# Patient Record
Sex: MALE | Race: BLACK OR AFRICAN AMERICAN | NOT HISPANIC OR LATINO | Employment: FULL TIME | URBAN - METROPOLITAN AREA
[De-identification: names, ages, dates, MRNs, and addresses within clinical notes are randomized per-mention and may not be internally consistent; named-entity substitution may affect disease eponyms.]

---

## 2017-05-19 ENCOUNTER — ANESTHESIA (OUTPATIENT)
Dept: SURGERY | Facility: OTHER | Age: 52
End: 2017-05-19
Payer: COMMERCIAL

## 2017-05-19 ENCOUNTER — ANESTHESIA EVENT (OUTPATIENT)
Dept: SURGERY | Facility: OTHER | Age: 52
End: 2017-05-19
Payer: COMMERCIAL

## 2017-05-19 ENCOUNTER — HOSPITAL ENCOUNTER (OUTPATIENT)
Facility: OTHER | Age: 52
Discharge: HOME | End: 2017-05-19
Attending: ORTHOPAEDIC SURGERY | Admitting: ORTHOPAEDIC SURGERY
Payer: COMMERCIAL

## 2017-05-19 VITALS
HEART RATE: 53 BPM | WEIGHT: 300 LBS | SYSTOLIC BLOOD PRESSURE: 123 MMHG | OXYGEN SATURATION: 98 % | TEMPERATURE: 98 F | DIASTOLIC BLOOD PRESSURE: 80 MMHG | RESPIRATION RATE: 16 BRPM | BODY MASS INDEX: 36.53 KG/M2 | HEIGHT: 76 IN

## 2017-05-19 DIAGNOSIS — R20.0 NUMBNESS OF LEFT HAND: Primary | ICD-10-CM

## 2017-05-19 PROCEDURE — 25000003 PHARM REV CODE 250: Performed by: NURSE ANESTHETIST, CERTIFIED REGISTERED

## 2017-05-19 PROCEDURE — 36000707: Performed by: ORTHOPAEDIC SURGERY

## 2017-05-19 PROCEDURE — 71000015 HC POSTOP RECOV 1ST HR: Performed by: ORTHOPAEDIC SURGERY

## 2017-05-19 PROCEDURE — 63600175 PHARM REV CODE 636 W HCPCS: Performed by: NURSE ANESTHETIST, CERTIFIED REGISTERED

## 2017-05-19 PROCEDURE — 63600175 PHARM REV CODE 636 W HCPCS: Performed by: ORTHOPAEDIC SURGERY

## 2017-05-19 PROCEDURE — 37000009 HC ANESTHESIA EA ADD 15 MINS: Performed by: ORTHOPAEDIC SURGERY

## 2017-05-19 PROCEDURE — 36000706: Performed by: ORTHOPAEDIC SURGERY

## 2017-05-19 PROCEDURE — 25000003 PHARM REV CODE 250: Performed by: ORTHOPAEDIC SURGERY

## 2017-05-19 PROCEDURE — 37000008 HC ANESTHESIA 1ST 15 MINUTES: Performed by: ORTHOPAEDIC SURGERY

## 2017-05-19 RX ORDER — SODIUM CHLORIDE, SODIUM LACTATE, POTASSIUM CHLORIDE, CALCIUM CHLORIDE 600; 310; 30; 20 MG/100ML; MG/100ML; MG/100ML; MG/100ML
INJECTION, SOLUTION INTRAVENOUS CONTINUOUS PRN
Status: DISCONTINUED | OUTPATIENT
Start: 2017-05-19 | End: 2017-05-19

## 2017-05-19 RX ORDER — GABAPENTIN 300 MG/1
300 CAPSULE ORAL 2 TIMES DAILY
COMMUNITY

## 2017-05-19 RX ORDER — SILDENAFIL 100 MG/1
100 TABLET, FILM COATED ORAL
COMMUNITY

## 2017-05-19 RX ORDER — ACETAMINOPHEN 10 MG/ML
INJECTION, SOLUTION INTRAVENOUS
Status: DISCONTINUED | OUTPATIENT
Start: 2017-05-19 | End: 2017-05-19

## 2017-05-19 RX ORDER — BUPIVACAINE HYDROCHLORIDE 2.5 MG/ML
INJECTION, SOLUTION EPIDURAL; INFILTRATION; INTRACAUDAL
Status: DISCONTINUED | OUTPATIENT
Start: 2017-05-19 | End: 2017-05-19 | Stop reason: HOSPADM

## 2017-05-19 RX ORDER — MIDAZOLAM HYDROCHLORIDE 1 MG/ML
INJECTION INTRAMUSCULAR; INTRAVENOUS
Status: DISCONTINUED | OUTPATIENT
Start: 2017-05-19 | End: 2017-05-19

## 2017-05-19 RX ORDER — PROPOFOL 10 MG/ML
VIAL (ML) INTRAVENOUS
Status: DISCONTINUED | OUTPATIENT
Start: 2017-05-19 | End: 2017-05-19

## 2017-05-19 RX ORDER — PROPOFOL 10 MG/ML
VIAL (ML) INTRAVENOUS CONTINUOUS PRN
Status: DISCONTINUED | OUTPATIENT
Start: 2017-05-19 | End: 2017-05-19

## 2017-05-19 RX ORDER — OXYCODONE AND ACETAMINOPHEN 5; 325 MG/1; MG/1
1 TABLET ORAL EVERY 6 HOURS PRN
Qty: 30 TABLET | Refills: 0 | Status: SHIPPED | OUTPATIENT
Start: 2017-05-19

## 2017-05-19 RX ORDER — TRAMADOL HYDROCHLORIDE 50 MG/1
50 TABLET ORAL EVERY 6 HOURS PRN
COMMUNITY

## 2017-05-19 RX ORDER — LIDOCAINE HCL/PF 100 MG/5ML
SYRINGE (ML) INTRAVENOUS
Status: DISCONTINUED | OUTPATIENT
Start: 2017-05-19 | End: 2017-05-19

## 2017-05-19 RX ORDER — FENTANYL CITRATE 50 UG/ML
INJECTION, SOLUTION INTRAMUSCULAR; INTRAVENOUS
Status: DISCONTINUED | OUTPATIENT
Start: 2017-05-19 | End: 2017-05-19

## 2017-05-19 RX ORDER — LIDOCAINE 50 MG/G
1 PATCH TOPICAL DAILY PRN
COMMUNITY

## 2017-05-19 RX ORDER — CEFAZOLIN SODIUM 2 G/50ML
2 SOLUTION INTRAVENOUS
Status: COMPLETED | OUTPATIENT
Start: 2017-05-19 | End: 2017-05-19

## 2017-05-19 RX ORDER — CEPHALEXIN 500 MG/1
500 CAPSULE ORAL 2 TIMES DAILY
Qty: 14 CAPSULE | Refills: 0 | Status: SHIPPED | OUTPATIENT
Start: 2017-05-19 | End: 2017-05-26

## 2017-05-19 RX ADMIN — PROPOFOL 40 MG: 10 INJECTION, EMULSION INTRAVENOUS at 10:05

## 2017-05-19 RX ADMIN — FENTANYL CITRATE 100 MCG: 50 INJECTION, SOLUTION INTRAMUSCULAR; INTRAVENOUS at 09:05

## 2017-05-19 RX ADMIN — PROPOFOL 20 MG: 10 INJECTION, EMULSION INTRAVENOUS at 10:05

## 2017-05-19 RX ADMIN — SODIUM CHLORIDE, SODIUM LACTATE, POTASSIUM CHLORIDE, AND CALCIUM CHLORIDE: 600; 310; 30; 20 INJECTION, SOLUTION INTRAVENOUS at 09:05

## 2017-05-19 RX ADMIN — MIDAZOLAM HYDROCHLORIDE 2 MG: 1 INJECTION, SOLUTION INTRAMUSCULAR; INTRAVENOUS at 09:05

## 2017-05-19 RX ADMIN — PROPOFOL 75 MCG/KG/MIN: 10 INJECTION, EMULSION INTRAVENOUS at 10:05

## 2017-05-19 RX ADMIN — CEFAZOLIN SODIUM 3 G: 2 SOLUTION INTRAVENOUS at 10:05

## 2017-05-19 RX ADMIN — LIDOCAINE HYDROCHLORIDE 50 MG: 20 INJECTION, SOLUTION INTRAVENOUS at 10:05

## 2017-05-19 RX ADMIN — ACETAMINOPHEN 1000 MG: 10 INJECTION, SOLUTION INTRAVENOUS at 10:05

## 2017-05-19 NOTE — ANESTHESIA POSTPROCEDURE EVALUATION
"Anesthesia Post Evaluation    Patient: Jong Alfaro    Procedure(s) Performed: Procedure(s) (LRB):  RELEASE-CARPAL TUNNEL (Left)    Final Anesthesia Type: general  Patient location during evaluation: [facility]  Patient participation: Yes- Able to Participate  Level of consciousness: awake and alert and oriented  Post-procedure vital signs: reviewed and stable  Pain management: adequate  Airway patency: patent  PONV status at discharge: No PONV  Anesthetic complications: no      Cardiovascular status: hemodynamically stable  Respiratory status: unassisted, spontaneous ventilation and room air  Hydration status: euvolemic  Follow-up not needed.        Visit Vitals    BP (!) 146/81 (BP Location: Right arm, Patient Position: Lying, BP Method: Automatic)    Pulse (!) 59    Temp 36.8 °C (98.2 °F) (Oral)    Resp 16    Ht 6' 4" (1.93 m)    Wt 136.1 kg (300 lb)    SpO2 96%    BMI 36.52 kg/m2       Pain/Emma Score: Pain Assessment Performed: Yes (5/19/2017  8:20 AM)  Presence of Pain: complains of pain/discomfort (5/19/2017  8:20 AM)      "

## 2017-05-19 NOTE — PLAN OF CARE
Jong Alfaro has met all discharge criteria from Phase II. Vital Signs are stable, ambulating  without difficulty. Discharge instructions given, patient verbalized understanding. Discharged from facility via wheelchair in stable condition.

## 2017-05-19 NOTE — PLAN OF CARE
Home medication list reviewed with patient. Each med name, dose, frequency reviewed w/ pt and spouse. Pt stated med list needed to be updated w/ new meds due to discontinuation of many meds in the past still in record. List updated per pt request.  Patient prefers to have [place] present for discharge teaching. Please contact them @[phone].

## 2017-05-19 NOTE — INTERVAL H&P NOTE
The patient has been examined and the H&P has been reviewed:    I concur with the findings and no changes have occurred since H&P was written.    Anesthesia/Surgery risks, benefits and alternative options discussed and understood by patient/family.          Active Hospital Problems    Diagnosis  POA    Numbness of left hand [R20.8]  Yes      Resolved Hospital Problems    Diagnosis Date Resolved POA   No resolved problems to display.

## 2017-05-19 NOTE — DISCHARGE INSTRUCTIONS
No heavy lifting.  Keep operative extremity elevated.      Anesthesia: Monitored Anesthesia Care (MAC)  Youre due to have surgery. During surgery, youll be given medication called anesthesia. This will keep you comfortable and pain-free. Your surgeon will use monitored anesthesia care (MAC). This sheet tells you more about this type of anesthesia.    What is monitored anesthesia care?  MAC keeps you very drowsy during surgery. You may be awake, but you will likely not remember much. And you wont feel pain. With MAC, medications are given through an IV line into a vein in your arm or hand. A local anesthetic will usually be injected into the skin and muscle around the surgical site to numb it. The anesthesia provider monitors you during the procedure. He or she checks your heart rate and rhythm, blood pressure, and blood oxygen level.    Anesthesia tools and medications that may be near you during your procedure  You will likely have:  · A pulse oximeter on the end of your finger. This measures your blood oxygen level.  · Electrocardiography leads (electrodes) on your chest. These record your heart rate and rhythm.  · Medications given through an IV. These relax you and prevent pain. You may be awake or sleep lightly. If you have local anesthetic, it is injected directly into your skin.  · A facemask to give you oxygen, if needed.  Risks and Possible Complications  MAC has some risks. These include:  · Breathing problems  · Nausea and vomiting  · Allergic reaction to the anesthetic      Anesthesia safety  · Follow all instructions you are given for how long not to eat or drink before your procedure.  · Be sure your doctor knows what medications you take, especially any anti-inflammatory medication or blood thinners. This includes aspirin and any other over-the-counter medications, herbs, and supplements.  · Have an adult family member or friend drive you home after the procedure.  · For the first 24 hours after  your surgery:  ¨ Do not drive or use heavy equipment.  ¨ Do not make important decisions or sign documents.  ¨ Avoid alcohol.  ¨ Have someone stay with you, if possible. They can watch for problems and help keep you safe.    Date Last Reviewed: 10/16/2014  © 8279-6290 [employer]. [address] [place] [address], [place] [address]. All rights reserved. This information is not intended as a substitute for professional medical care. Always follow your healthcare professional's instructions.

## 2017-05-19 NOTE — TRANSFER OF CARE
"Anesthesia Transfer of Care Note    Patient: Jong Alfaro    Procedure(s) Performed: Procedure(s) (LRB):  RELEASE-CARPAL TUNNEL (Left)    Patient location: [facility]    Anesthesia Type: general    Transport from OR: Transported from OR on room air with adequate spontaneous ventilation    Post pain: adequate analgesia    Post assessment: no apparent anesthetic complications and tolerated procedure well    Post vital signs: stable    Level of consciousness: awake, alert and oriented    Nausea/Vomiting: no nausea/vomiting    Complications: none    Transfer of care protocol was followed      Last vitals:   Visit Vitals    BP (!) 146/81 (BP Location: Right arm, Patient Position: Lying, BP Method: Automatic)    Pulse (!) 59    Temp 36.8 °C (98.2 °F) (Oral)    Resp 16    Ht 6' 4" (1.93 m)    Wt 136.1 kg (300 lb)    SpO2 96%    BMI 36.52 kg/m2     "

## 2017-05-19 NOTE — BRIEF OP NOTE
Pre Op Dx: L CTS  Post Op Dx: same  Procedure: L CTR  Surgeon: KARLA Barrera  Anesthesia: local MAC  EBL: minimal  Complications: None  Patient tolerated procedure well and was discharged to home in stable condition.  He was D/C'd with a prescription for Percocet and Keflex and is scheduled for F/U in my office in 1 week.

## 2017-05-19 NOTE — ANESTHESIA PREPROCEDURE EVALUATION
05/19/2017  Jong Alfaro is a 51 y.o., male.    Anesthesia Evaluation    I have reviewed the Patient Summary Reports.    I have reviewed the Nursing Notes.   I have reviewed the Medications.     Review of Systems  Anesthesia Hx:  No problems with previous Anesthesia  History of prior surgery of interest to airway management or planning:   Social:  Former Smoker    Hematology/Oncology:  Hematology Normal   Oncology Normal     EENT/Dental:EENT/Dental Normal   Cardiovascular:   Exercise tolerance: good Hypertension, well controlled    Pulmonary:  Pulmonary Normal    Renal/:  Renal/ Normal     Hepatic/GI:  Hepatic/GI Normal    Musculoskeletal:  Musculoskeletal Normal    Neurological:   Headaches    Endocrine:  Endocrine Normal    Dermatological:  Skin Normal    Psych:   Psychiatric History          Physical Exam  General:  Well nourished                 Anesthesia Plan  Type of Anesthesia, risks & benefits discussed:  Anesthesia Type:  MAC  Patient's Preference:   Intra-op Monitoring Plan:   Intra-op Monitoring Plan Comments:   Post Op Pain Control Plan:   Post Op Pain Control Plan Comments:   Induction:   IV  Beta Blocker:         Informed Consent: Patient understands risks and agrees with Anesthesia plan.  Questions answered. Anesthesia consent signed with patient.  ASA Score: 2     Day of Surgery Review of History & Physical:    H&P update referred to the surgeon.         Ready For Surgery From Anesthesia Perspective.

## 2017-05-19 NOTE — IP AVS SNAPSHOT
[facility] Location ([place])  [address] [place], [place] [address]  Phone: [phone]           Patient Discharge Instructions   Our goal is to set you up for success. This packet includes information on your condition, medications, and your home care.  It will help you care for yourself to prevent having to return to the hospital.     Please ask your nurse if you have any questions.      There are many details to remember when preparing to leave the hospital. Here is what you will need to do:    1. Take your medicine. If you are prescribed medications, review your Medication List on the following pages. You may have new medications to  at the pharmacy and others that you'll need to stop taking. Review the instructions for how and when to take your medications. Talk with your doctor or nurses if you are unsure of what to do.     2. Go to your follow-up appointments. Specific follow-up information is listed in the following pages. Your may be contacted by a nurse or clinical provider about future appointments. Be sure we have all of the phone numbers to reach you. Please contact your provider's office if you are unable to make an appointment.     3. Watch for warning signs. Your doctor or nurse will give you detailed warning signs to watch for and when to call for assistance. These instructions may also include educational information about your condition. If you experience any of warning signs to your health, call your doctor.               ** Verify the list of medication(s) below is accurate and up to date. Carry this with you in case of emergency. If your medications have changed, please notify your healthcare provider.             Medication List      START taking these medications        Additional Info                      cephALEXin 500 MG capsule   Commonly known as:  KEFLEX   Quantity:  14 capsule   Refills:  0   Dose:  500 mg    Instructions:  Take 1 capsule (500 mg total) by mouth 2  (two) times daily.     Begin Date    AM    Noon    PM    Bedtime       oxycodone-acetaminophen 5-325 mg per tablet   Commonly known as:  PERCOCET   Quantity:  30 tablet   Refills:  0   Dose:  1 tablet    Instructions:  Take 1 tablet by mouth every 6 (six) hours as needed for Pain.     Begin Date    AM    Noon    PM    Bedtime         CONTINUE taking these medications        Additional Info                      EUCERIN Crea   Refills:  0   Generic drug:  white petrolatum-mineral oil    Instructions:  Apply topically as needed.     Begin Date    AM    Noon    PM    Bedtime       gabapentin 300 MG capsule   Commonly known as:  NEURONTIN   Refills:  0   Dose:  300 mg    Instructions:  Take 300 mg by mouth 2 (two) times daily.     Begin Date    AM    Noon    PM    Bedtime       hydrochlorothiazide 50 MG tablet   Commonly known as:  HYDRODIURIL   Refills:  0    Instructions:  once daily. Pt takes half daily     Begin Date    AM    Noon    PM    Bedtime       ibuprofen 800 MG tablet   Commonly known as:  ADVIL,MOTRIN   Refills:  0      Begin Date    AM    Noon    PM    Bedtime       lidocaine 5 %   Commonly known as:  LIDODERM   Refills:  0   Dose:  1 patch    Instructions:  Place 1 patch onto the skin daily as needed. Remove & Discard patch within 12 hours or as directed by MD     Begin Date    AM    Noon    PM    Bedtime       lisinopril 40 MG tablet   Commonly known as:  PRINIVIL,ZESTRIL   Refills:  0   Dose:  40 mg    Instructions:  Take 40 mg by mouth once daily. Pt takes half tablet     Begin Date    AM    Noon    PM    Bedtime       sildenafil 100 MG tablet   Commonly known as:  VIAGRA   Refills:  0   Dose:  100 mg    Instructions:  Take 100 mg by mouth as needed for Erectile Dysfunction.     Begin Date    AM    Noon    PM    Bedtime       tramadol 50 mg tablet   Commonly known as:  ULTRAM   Refills:  0   Dose:  50 mg    Instructions:  Take 50 mg by mouth every 6 (six) hours as needed for Pain.     Begin Date    AM     Noon    PM    Bedtime            Where to Get Your Medications      You can get these medications from any pharmacy     Bring a paper prescription for each of these medications     cephALEXin 500 MG capsule    oxycodone-acetaminophen 5-325 mg per tablet                  Please bring to all follow up appointments:    1. A copy of your discharge instructions.  2. All medicines you are currently taking in their original bottles.  3. Identification and insurance card.    Please arrive 15 minutes ahead of scheduled appointment time.    Please call 24 hours in advance if you must reschedule your appointment and/or time.        Follow-up Information     Follow up with Dima Barrera MD On 5/24/2017.    Specialty:  Orthopedic Surgery    Contact information:    [facility] [facility] [address]  [phone]          Discharge Instructions     Future Orders    Activity as tolerated     Diet general     Questions:    Total calories:      Fat restriction, if any:      Protein restriction, if any:      Na restriction, if any:      Fluid restriction:      Additional restrictions:      Leave dressing on - Keep it clean, dry, and intact until clinic visit     Lifting restrictions         Discharge Instructions       No heavy lifting.  Keep operative extremity elevated.      Anesthesia: Monitored Anesthesia Care (MAC)  Youre due to have surgery. During surgery, youll be given medication called anesthesia. This will keep you comfortable and pain-free. Your surgeon will use monitored anesthesia care (MAC). This sheet tells you more about this type of anesthesia.    What is monitored anesthesia care?  MAC keeps you very drowsy during surgery. You may be awake, but you will likely not remember much. And you wont feel pain. With MAC, medications are given through an IV line into a vein in your arm or hand. A local anesthetic will usually be injected into the skin and muscle around the surgical site to numb it. The  anesthesia provider monitors you during the procedure. He or she checks your heart rate and rhythm, blood pressure, and blood oxygen level.    Anesthesia tools and medications that may be near you during your procedure  You will likely have:  · A pulse oximeter on the end of your finger. This measures your blood oxygen level.  · Electrocardiography leads (electrodes) on your chest. These record your heart rate and rhythm.  · Medications given through an IV. These relax you and prevent pain. You may be awake or sleep lightly. If you have local anesthetic, it is injected directly into your skin.  · A facemask to give you oxygen, if needed.  Risks and Possible Complications  MAC has some risks. These include:  · Breathing problems  · Nausea and vomiting  · Allergic reaction to the anesthetic      Anesthesia safety  · Follow all instructions you are given for how long not to eat or drink before your procedure.  · Be sure your doctor knows what medications you take, especially any anti-inflammatory medication or blood thinners. This includes aspirin and any other over-the-counter medications, herbs, and supplements.  · Have an adult family member or friend drive you home after the procedure.  · For the first 24 hours after your surgery:  ¨ Do not drive or use heavy equipment.  ¨ Do not make important decisions or sign documents.  ¨ Avoid alcohol.  ¨ Have someone stay with you, if possible. They can watch for problems and help keep you safe.    Date Last Reviewed: 10/16/2014  © 4898-3912 [employer]. [address] [place] [address]. All rights reserved. This information is not intended as a substitute for professional medical care. Always follow your healthcare professional's instructions.            Admission Information     Date & Time Provider Department CSN    5/19/2017  7:30 AM Dima Barrera MD [facility] [id number]      Care Providers     Provider Role Specialty  "Primary office phone    Dima Barrera MD Attending Provider Orthopedic Surgery [phone]    Dima Barrera MD Surgeon  Orthopedic Surgery [phone]      Your Vitals Were     BP Pulse Temp Resp Height Weight    146/81 (BP Location: Right arm, Patient Position: Lying, BP Method: Automatic) 59 98.2 °F (36.8 °C) (Oral) 16 6' 4" (1.93 m) 136.1 kg (300 lb)    SpO2 BMI             96% 36.52 kg/m2         Recent Lab Values     No lab values to display.      Allergies as of 5/19/2017     No Known Allergies      [facility]     [facility] Nurse Care Line - 24/7 Assistance  Unless otherwise directed by your provider, please contact [facility], our nurse care line that is available for 24/7 assistance.     Registered nurses in the [facility] Center provide clinical advisement, health education, appointment booking, and other advisory services.  Call for this free service at [phone].        Advance Directives     An advance directive is a document which, in the event you are no longer able to make decisions for yourself, tells your healthcare team what kind of treatment you do or do not want to receive, or who you would like to make those decisions for you.  If you do not currently have an advance directive, [facility] encourages you to create one.  For more information call:  [phone] ([phone]), [phone] ([phone]),  or log on to [email or web address].        Smoking Cessation     If you would like to quit smoking:   You may be eligible for free services if you are a [place] resident and started smoking cigarettes before September 1, 1988.  Call the Smoking Cessation Trust (SCT) toll free at (073) 397-7936 or (655) 862-8289.   Call 9-028-QUIT-NOW if you do not meet the above criteria.   Contact us via email: [email or web address]   View our website for more information: [email or web address]        Language Assistance Services     ATTENTION: " Language assistance services are available, free of charge. Please call [phone].      ATENCIÓN: Si birdie farley, tiene a whittaker disposición servicios gratuitos de asistencia lingüística. Llame al [phone].     CHÚ Ý: N?u b?n nói Ti?ng Vi?t, có các d?ch v? h? tr? ngôn ng? mi?n phí dành cho b?n. G?i s? [phone].        [facility] Sign-Up     Activating your [facility] account is as easy as 1-2-3!     1) Visit [email or web address], select Sign Up Now, enter this activation code and your date of birth, then select Next.  [email or web address]  Expires: 7/3/2017 10:53 AM      2) Create a username and password to use when you visit [facility] in the future and select a security question in case you lose your password and select Next.    3) Enter your e-mail address and click Sign Up!    Additional Information  If you have questions, please e-mail [email or web address] or call [phone] to talk to our [facility] staff. Remember, [facility] is NOT to be used for urgent needs. For medical emergencies, dial 911.          [facility] complies with applicable Federal civil rights laws and does not discriminate on the basis of race, color, national origin, age, disability, or sex.

## 2017-05-19 NOTE — H&P
The patient is a 51-year-old male who present complaining of left hand pain and   numbness for several months and states the pain is off and on but has been   gradually worsening.  He also states that he has had EMG and nerve conduction   studies which have revealed carpal tunnel syndrome.      PAST MEDICAL HISTORY:  None.      PAST SURGICAL HISTORY:  Open reduction internal fixation of left wrist.      ALLERGIES:  None.      FAMILY HISTORY:  Negative.      SOCIAL HISTORY:  Negative.      REVIEW OF SYSTEMS:  Significant for joint pain and weight gain.      PHYSICAL EXAMINATION   VITAL SIGNS:  Height 6 feet 4 inches; weight 310 pounds; blood pressure 131/82;   pulse 60.   GENERAL:  Well-developed, well-nourished male in no apparent distress.   HEENT:  Normocephalic, atraumatic.  Pupils equal, round, and reactive to light.   NECK:  Supple.  Nontender.   LUNGS:  Clear to auscultation.   HEART:  Regular rate and rhythm.   ABDOMEN:  Soft, nontender.  Normoactive bowel sounds.   SKIN:  Intact.   MUSCULOSKELETAL:  Left wrist and hand decreased sensation over left thumb, index   finger, and middle finger volar aspect.  Range of motion of left wrist is 45   degrees of palmar flexion and 70 degrees of dorsiflexion.  EMG and nerve conduction   studies done on February 7, 2017, reveal bilateral carpal tunnel syndrome.      ASSESSMENT:  Left carpal tunnel syndrome.      PLAN: Left carpal tunnel release.

## 2017-05-26 NOTE — OP NOTE
DATE OF PROCEDURE:  05/19/2017.    PREOPERATIVE DIAGNOSIS:  Left carpal tunnel syndrome.    POSTOPERATIVE DIAGNOSIS:  Left carpal tunnel syndrome.    PROCEDURE:  Left carpal tunnel release.    SURGEON:  Dima Barrera M.D.    ANESTHESIA:  Local MAC.    ESTIMATED BLOOD LOSS:  Minimal.    COMPLICATIONS:  None.    DRAINS:  None.    INDICATIONS:  The patient is a 51-year-old male who presents complaining of left   hand pain and numbness for several months that has gradually been worsening.    He was evaluated by me with physical examination and EMG nerve conduction   studies and was found to have left carpal tunnel syndrome.  He was offered   surgical intervention in the form of left carpal tunnel release for which he   agreed and was scheduled for surgery and on 05/19/2017, the patient presented to   [facility], he was taken to the Operating Room and placed   on the operating table in the supine position.  The left upper extremity was   prepped and draped in the usual sterile fashion after adequate MAC anesthesia   was administered and then local anesthesia was injected into the base of the   palmar aspect of the hand along the planned incision site.  Then, a longitudinal   incision was made at the base of the left hand.  Sharp dissection was carried   down through the subcutaneous tissue to the level of the palmar fascia.  This   was also sharply incised down to the transverse carpal ligament.  The mid   portion of the transverse carpal ligament was divided ____ distally and using a   15-blade knife the transverse carpal ligament was divided distally.  The same   thing was done in a proximal direction.  Following this, a Metzenbaum scissor   was used to release any further adhesions or residual transverse carpal   ligament.  After adequate release was complete, the wound was irrigated with   copious amounts of irrigation and the skin was closed using 3-0 plain suture.    Sterile dressing was  then applied and the patient tolerated the procedure well   and was transferred to Recovery Room in stable condition.      JALEESA/  dd: 05/26/2017 15:41:44 (CDT)  td: 05/26/2017 17:30:36 (CDT)  Doc ID   #[id number]  Job ID #[id number]    CC:

## (undated) DEVICE — GAUZE SPONGE 4X4 12PLY

## (undated) DEVICE — TOURNIQUET SB QC SP 18X4IN

## (undated) DEVICE — UNDERGLOVES BIOGEL PI SIZE 8

## (undated) DEVICE — BANDAGE ELASTIC 2X5 VELCRO ST

## (undated) DEVICE — SUT PLAIN GUT 3-0

## (undated) DEVICE — PAD CAST SPECIALIST STRL 3

## (undated) DEVICE — PAD UNDERPAD 30X30

## (undated) DEVICE — PACK UPPER EXTREMITY BAPTIST

## (undated) DEVICE — SEE MEDLINE ITEM 157131

## (undated) DEVICE — APPLICATOR CHLORAPREP ORN 26ML

## (undated) DEVICE — GLOVE BIOGEL SKINSENSE PI 8.0

## (undated) DEVICE — CORD FOR BIPOLAR FORCEPS 12

## (undated) DEVICE — SYR B-D DISP CONTROL 10CC100/C

## (undated) DEVICE — SOL 9P NACL IRR PIC IL

## (undated) DEVICE — SEE MEDLINE ITEM 146308

## (undated) DEVICE — DRESSING XEROFORM FOIL PK 1X8

## (undated) DEVICE — PADDING CAST 4IN

## (undated) DEVICE — PAD CAST SPECIALIST STRL 4

## (undated) DEVICE — NDL HYPO REG 25G X 1 1/2

## (undated) DEVICE — DRESSING XEROFORM 1X8IN